# Patient Record
Sex: FEMALE | Race: WHITE | NOT HISPANIC OR LATINO | Employment: FULL TIME | URBAN - METROPOLITAN AREA
[De-identification: names, ages, dates, MRNs, and addresses within clinical notes are randomized per-mention and may not be internally consistent; named-entity substitution may affect disease eponyms.]

---

## 2017-01-02 ENCOUNTER — OFFICE VISIT (OUTPATIENT)
Dept: URGENT CARE | Facility: URGENT CARE | Age: 52
End: 2017-01-02
Payer: COMMERCIAL

## 2017-01-02 VITALS
TEMPERATURE: 98.4 F | WEIGHT: 141 LBS | RESPIRATION RATE: 16 BRPM | OXYGEN SATURATION: 97 % | HEART RATE: 88 BPM | SYSTOLIC BLOOD PRESSURE: 96 MMHG | DIASTOLIC BLOOD PRESSURE: 60 MMHG | BODY MASS INDEX: 20.81 KG/M2

## 2017-01-02 DIAGNOSIS — J01.90 ACUTE SINUSITIS WITH SYMPTOMS > 10 DAYS: Primary | ICD-10-CM

## 2017-01-02 PROCEDURE — 99203 OFFICE O/P NEW LOW 30 MIN: CPT | Performed by: FAMILY MEDICINE

## 2017-01-02 RX ORDER — BENZONATATE 100 MG/1
200 CAPSULE ORAL 3 TIMES DAILY PRN
Qty: 42 CAPSULE | Refills: 0 | Status: SHIPPED | OUTPATIENT
Start: 2017-01-02 | End: 2018-07-22

## 2017-01-02 RX ORDER — ALBUTEROL SULFATE 90 UG/1
2 AEROSOL, METERED RESPIRATORY (INHALATION) EVERY 6 HOURS PRN
Qty: 3 INHALER | Refills: 1 | Status: SHIPPED | OUTPATIENT
Start: 2017-01-02 | End: 2020-12-20

## 2017-01-02 RX ORDER — CEFDINIR 300 MG/1
300 CAPSULE ORAL 2 TIMES DAILY
Qty: 20 CAPSULE | Refills: 0 | Status: SHIPPED | OUTPATIENT
Start: 2017-01-02 | End: 2018-07-22

## 2017-01-02 NOTE — NURSING NOTE
"Chief Complaint   Patient presents with     Urgent Care     URI     Started 1 week ago.      Cough     Productive cough with green mucous. Trouble sleeping at  night     Ear Problem     Left ear pain for 1 week.      Sinus Problem     Sinus headache and pressure since saturday.     Fever     100.3 last night       Initial BP 96/60 mmHg  Pulse 88  Temp(Src) 98.4  F (36.9  C) (Tympanic)  Resp 16  Wt 141 lb (63.957 kg)  SpO2 97% Estimated body mass index is 20.81 kg/(m^2) as calculated from the following:    Height as of 9/3/12: 5' 9\" (1.753 m).    Weight as of this encounter: 141 lb (63.957 kg).  BP completed using cuff size: regular    ASAEL Miller    "

## 2018-07-22 ENCOUNTER — OFFICE VISIT (OUTPATIENT)
Dept: URGENT CARE | Facility: URGENT CARE | Age: 53
End: 2018-07-22
Payer: COMMERCIAL

## 2018-07-22 ENCOUNTER — RADIANT APPOINTMENT (OUTPATIENT)
Dept: GENERAL RADIOLOGY | Facility: CLINIC | Age: 53
End: 2018-07-22
Attending: FAMILY MEDICINE
Payer: COMMERCIAL

## 2018-07-22 VITALS
BODY MASS INDEX: 22.22 KG/M2 | SYSTOLIC BLOOD PRESSURE: 122 MMHG | HEIGHT: 69 IN | HEART RATE: 68 BPM | DIASTOLIC BLOOD PRESSURE: 60 MMHG | WEIGHT: 150 LBS | TEMPERATURE: 98.7 F | OXYGEN SATURATION: 98 %

## 2018-07-22 DIAGNOSIS — M79.675 PAIN OF TOE OF LEFT FOOT: ICD-10-CM

## 2018-07-22 DIAGNOSIS — S92.912A: Primary | ICD-10-CM

## 2018-07-22 PROCEDURE — 99213 OFFICE O/P EST LOW 20 MIN: CPT | Performed by: FAMILY MEDICINE

## 2018-07-22 PROCEDURE — 73660 X-RAY EXAM OF TOE(S): CPT | Mod: LT

## 2018-07-22 NOTE — NURSING NOTE
"Ashlie Gorman;   Chief Complaint   Patient presents with     Foot Problems     chair fell onto left foot, 3rd toe, feeling pressure      Urgent Care     Initial /60 (BP Location: Right arm, Patient Position: Chair, Cuff Size: Adult Regular)  Pulse 68  Temp 98.7  F (37.1  C) (Oral)  Ht 5' 8.75\" (1.746 m)  Wt 150 lb (68 kg)  SpO2 98%  BMI 22.31 kg/m2 Estimated body mass index is 22.31 kg/(m^2) as calculated from the following:    Height as of this encounter: 5' 8.75\" (1.746 m).    Weight as of this encounter: 150 lb (68 kg)..  BP completed using cuff size regular.  Sylvie Gaitan R.N.  "

## 2018-07-22 NOTE — PROGRESS NOTES
"SUBJECTIVE:  Chief Complaint   Patient presents with     Foot Problems     chair fell onto left foot, 3rd toe, feeling pressure      Urgent Care     Ashlie Gorman is a 53 year old female presents with a chief complaint of pain, throbbing, bruising at the left third toe.  The injury occurred yesterday at around 11 am   The injury happened while at home and was barefoot when the injury occurred. How: a chair fell onto the patient's left third toe, causing bruising, swelling, pain.  The patient complained of pain  and has has had decreased ROM.  Pain exacerbated by movement of the affected toe.  .  This is the first time this type of injury has occurred to this patient.     Last Tetanus booster was within the past 10 years.     Past medical history:    Hypothyroidism  Seasonal Allergies    Current Outpatient Prescriptions   Medication Sig Dispense Refill     Fexofenadine HCl (ALLEGRA PO)        SYNTHROID 112 MCG OR TABS 1 TABLET DAILY       albuterol (PROAIR HFA/PROVENTIL HFA/VENTOLIN HFA) 108 (90 BASE) MCG/ACT Inhaler Inhale 2 puffs into the lungs every 6 hours as needed for shortness of breath / dyspnea or wheezing (Patient not taking: Reported on 7/22/2018) 3 Inhaler 1     Social History   Substance Use Topics     Smoking status: Never Smoker     Smokeless tobacco: Never Used     Alcohol use Not on file       ROS:  Review of systems negative except as stated above.    EXAM:   /60 (BP Location: Right arm, Patient Position: Chair, Cuff Size: Adult Regular)  Pulse 68  Temp 98.7  F (37.1  C) (Oral)  Ht 5' 8.75\" (1.746 m)  Wt 150 lb (68 kg)  SpO2 98%  BMI 22.31 kg/m2  Gen: healthy,alert,no distress  Extremity: Left third toe has a hematoma present at the distal left third toe.  No dehisced lacerations.  There is a superficial abrasion at the tip of the left third toe.  The toenail is intact.    GAIT:  within normal limits     X-RAY was done.  X-rays of the left third toe:   comminuted fracture at the distal " phalanx.      ASSESSMENT:   Left third toe pain  Closed Fracture of the distal phalanx of the left third toe.       PLAN:  1) Rest, Ice, elevation  2) Gary tape the injured toe to the neighboring toe to form a dynamic splint  3) Antibiotic ointment.  Keep the wound covered until a scab forms.   4) Patient is concerned about this toe injury, since she will be on vacation in a month (hiking in [place]).  As a result, I ordered a podiatry referral for the patient.       Selvin Shields MD

## 2018-07-22 NOTE — PATIENT INSTRUCTIONS
follow up with a podiatrist for further evaluation and treatment.      Apply antibiotic ointment onto the open wounds and cover with gauze    Tape the injured toe to a neighboring toe to serve as a dynamic splint.      Elevate the left foot above the level of the heart to decrease some of the pain.

## 2018-07-22 NOTE — MR AVS SNAPSHOT
After Visit Summary   7/22/2018    Ashlie Gorman    MRN: [id number]           Patient Information     Date Of Birth          1965        Visit Information        Provider Department      7/22/2018 12:30 PM Selvin Shields MD [facility]        Today's Diagnoses     Closed fracture of distal phalanx of toe of left foot    -  1    Pain of toe of left foot          Care Instructions    follow up with a podiatrist for further evaluation and treatment.      Apply antibiotic ointment onto the open wounds and cover with gauze    Tape the injured toe to a neighboring toe to serve as a dynamic splint.      Elevate the left foot above the level of the heart to decrease some of the pain.               Follow-ups after your visit        Additional Services     PODIATRY/FOOT & ANKLE SURGERY REFERRAL       Your provider has referred you to: PREFERRED PROVIDERS:  FMG: [facility] [phone]   [email or web address]  FMG: [facility] [phone]   [email or web address]    Please be aware that coverage of these services is subject to the terms and limitations of your health insurance plan.  Call member services at your health plan with any benefit or coverage questions.      Please bring the following to your appointment:  >>   Any x-rays, CTs or MRIs which have been performed.  Contact the facility where they were done to arrange for  prior to your scheduled appointment.    >>   List of current medications   >>   This referral request   >>   Any documents/labs given to you for this referral                  Who to contact     If you have questions or need follow up information about today's clinic visit or your schedule please contact [facility] directly at [phone].  Normal or non-critical lab and imaging results will be communicated to you by MyChart, letter or phone within 4  "business days after the clinic has received the results. If you do not hear from us within 7 days, please contact the clinic through [employer] or phone. If you have a critical or abnormal lab result, we will notify you by phone as soon as possible.  Submit refill requests through [email or web address] or call your pharmacy and they will forward the refill request to us. Please allow 3 business days for your refill to be completed.          Additional Information About Your Visit        Care EveryWhere ID     This is your Care EveryWhere ID. This could be used by other organizations to access your [place] medical records  [id number]        Your Vitals Were     Pulse Temperature Height Pulse Oximetry BMI (Body Mass Index)       68 98.7  F (37.1  C) (Oral) 5' 8.75\" (1.746 m) 98% 22.31 kg/m2        Blood Pressure from Last 3 Encounters:   07/22/18 122/60   01/02/17 96/60   09/03/12 122/80    Weight from Last 3 Encounters:   07/22/18 150 lb (68 kg)   01/02/17 141 lb (64 kg)   09/03/12 137 lb (62.1 kg)              We Performed the Following     PODIATRY/FOOT & ANKLE SURGERY REFERRAL        Primary Care Provider Fax #    [place] [facility] [phone]       No address on file        Equal Access to Services     JACQUELYN OJEDA : Hadii maria m ku hadasho Sokalaniali, waaxda luqadaha, qaybta kaalmada adeegyada, waxay juan antonio ho . So [facility] [phone].    ATENCIÓN: Si habla español, tiene a enrique disposición servicios gratuitos de asistencia lingüística. Llame al [phone].    We comply with applicable federal civil rights laws and [place] laws. We do not discriminate on the basis of race, color, national origin, age, disability, sex, sexual orientation, or gender identity.            Thank you!     [facility] you for choosing [facility]  for your care. Our goal is always to provide you with excellent care. Hearing back from our patients is one way we can continue to improve our services. Please take a few " minutes to complete the written survey that you may receive in the mail after your visit with us. Thank you!             Your Updated Medication List - Protect others around you: Learn how to safely use, store and throw away your medicines at www.disposemymeds.org.          This list is accurate as of 7/22/18  2:09 PM.  Always use your most recent med list.                   Brand Name Dispense Instructions for use Diagnosis    albuterol 108 (90 Base) MCG/ACT Inhaler    PROAIR HFA/PROVENTIL HFA/VENTOLIN HFA    3 Inhaler    Inhale 2 puffs into the lungs every 6 hours as needed for shortness of breath / dyspnea or wheezing    Acute sinusitis with symptoms > 10 days       ALLEGRA PO           SYNTHROID 112 MCG tablet   Generic drug:  levothyroxine      1 TABLET DAILY

## 2018-07-23 ENCOUNTER — OFFICE VISIT (OUTPATIENT)
Dept: PODIATRY | Facility: CLINIC | Age: 53
End: 2018-07-23
Payer: COMMERCIAL

## 2018-07-23 VITALS
WEIGHT: 150 LBS | DIASTOLIC BLOOD PRESSURE: 64 MMHG | HEIGHT: 69 IN | SYSTOLIC BLOOD PRESSURE: 120 MMHG | BODY MASS INDEX: 22.22 KG/M2

## 2018-07-23 DIAGNOSIS — S91.209A TRAUMATIC AVULSION OF NAIL PLATE OF TOE, INITIAL ENCOUNTER: ICD-10-CM

## 2018-07-23 DIAGNOSIS — L60.1 ONYCHOLYSIS OF TOENAIL: ICD-10-CM

## 2018-07-23 DIAGNOSIS — S90.222A CONTUSION OF LESSER TOE OF LEFT FOOT WITH DAMAGE TO NAIL, INITIAL ENCOUNTER: Primary | ICD-10-CM

## 2018-07-23 PROCEDURE — 99203 OFFICE O/P NEW LOW 30 MIN: CPT | Mod: 25 | Performed by: PODIATRIST

## 2018-07-23 PROCEDURE — 11730 AVULSION NAIL PLATE SIMPLE 1: CPT | Mod: T2 | Performed by: PODIATRIST

## 2018-07-23 RX ORDER — CEPHALEXIN 500 MG/1
500 CAPSULE ORAL 3 TIMES DAILY
Qty: 30 CAPSULE | Refills: 0 | Status: SHIPPED | OUTPATIENT
Start: 2018-07-23 | End: 2020-12-20

## 2018-07-23 NOTE — MR AVS SNAPSHOT
After Visit Summary   7/23/2018    Ashlie Gorman    MRN: [id number]           Patient Information     Date Of Birth          1965        Visit Information        Provider Department      7/23/2018 8:00 AM Hamzah Darling DPM [facility]        Today's Diagnoses     Contusion of lesser toe of left foot with damage to nail, initial encounter    -  1      Care Instructions    Thank you for choosing [facility]!    DR. DARLING'S CLINIC LOCATIONS:   MONDAY - [place] TUESDAY - [place]   [address]  [address]   [place] [address] [place] [address]   [phone]       THURSDAY AM - [place] THURSDAY PM - [place]   [address]   [place] [address] [place] [address]   [phone]       FRIDAY AM - [place] SET UP SURGERY: [phone] [address] APPOINTMENTS: [phone]   [place] [address] BILLING QUESTIONS: [phone] FAX NUMBER: [phone]     [place]OWN TOENAIL REMOVAL HOME INSTRUCTIONS  1. After the procedure, go home and elevate the foot/feet for the remainder of the day/evening as able. This is to minimize swelling, control pain, and limit post-procedural complications. The pre-procedural injection may cause your toe to be numb anywhere from 1-2 hours.    2. You can take Tylenol, Ibuprofen, Advil, etc as needed for pain if tolerated. Follow label instructions.     3. If you have been given a prescription for antibiotics, take them as instructed and complete the entire prescription.    4. Keep dressing intact until the following morning. Then remove the bandage (you may need to soak it in warm soapy water as the bandage will likely adhere to your skin).    5. Start soaking in warm soapy water for 5-10 minutes twice a day. Wash the toe thoroughly, dry the toe thoroughly. Apply antibiotic wound ointment to base of wound and cover with  gauze and Coban dressing (not too tightly) until it stops draining. This may take a few days to weeks, but at that point, you may continue with antibiotic ointment and a band-aid, or you may stop applying a dressing all together. Dressing changes should be done twice daily if you had the permanent/chemical procedure done.    6. You may do activities as tolerated the following day. Find a shoe that is comfortable and minimizes the amount of rubbing on your toe, as this may increase pain, swelling, etc.    7. Monitor for signs of infection. With this procedure, it is common to have mild surrounding redness and drainage. If the redness involves the entire great toe or if you notice red streaks on top of your foot, or if you experience any nausea, vomiting, chills, fevers > 101 degrees, call clinic for a quick appointment.        Body Mass Index (BMI)  Many things can cause foot and ankle problems. Foot structure, activity level, foot mechanics and injuries are common causes of pain.  One very important issue that often goes unmentioned, is body weight.  Extra weight can cause increased stress on muscles, ligaments, bones and tendons.  Sometimes just a few extra pounds is all it takes to put one over her/his threshold. Without reducing that stress, it can be difficult to alleviate pain. Some people are uncomfortable addressing this issue, but we feel it is important for you to think about it. As Foot &  Ankle specialists, our job is addressing the lower extremity problem and possible causes. Regarding extra body weight, we encourage patients to discuss diet and weight management plans with their primary care doctors. It is this team approach that gives you the best opportunity for pain relief and getting you back on your feet.              Follow-ups after your visit        Your next 10 appointments already scheduled     Aug 06, 2018  8:15 AM CDT   Return Visit with SHILA [facility]  "([facility] [place])    [address]  [address]  [place] [address] [phone]              Who to contact     If you have questions or need follow up information about today's clinic visit or your schedule please contact [facility] directly at [phone].  Normal or non-critical lab and imaging results will be communicated to you by MyChart, letter or phone within 4 business days after the clinic has received the results. If you do not hear from us within 7 days, please contact the clinic through MyChart or phone. If you have a critical or abnormal lab result, we will notify you by phone as soon as possible.  Submit refill requests through [employer] or call your pharmacy and they will forward the refill request to us. Please allow 3 business days for your refill to be completed.          Additional Information About Your Visit        Care EveryWhere ID     This is your Care EveryWhere ID. This could be used by other organizations to access your [place] medical records  [id number]        Your Vitals Were     Height BMI (Body Mass Index)                5' 8.75\" (1.746 m) 22.31 kg/m2           Blood Pressure from Last 3 Encounters:   07/23/18 120/64   07/22/18 122/60   01/02/17 96/60    Weight from Last 3 Encounters:   07/23/18 150 lb (68 kg)   07/22/18 150 lb (68 kg)   01/02/17 141 lb (64 kg)              Today, you had the following     No orders found for display         Today's Medication Changes          These changes are accurate as of 7/23/18  8:54 AM.  If you have any questions, ask your nurse or doctor.               Start taking these medicines.        Dose/Directions    cephALEXin 500 MG capsule   Commonly known as:  KEFLEX   Used for:  Contusion of lesser toe of left foot with damage to nail, initial encounter   Started by:  Hamzah Barney DPM        Dose:  500 mg   Take 1 capsule (500 mg) by mouth 3 times daily   Quantity:  30 capsule   Refills:  0          "   Where to get your medicines      These medications were sent to [facility] [place] - [place] - [address]   [address]  [address], [place] [address]     Phone:  [phone]     cephALEXin 500 MG capsule                Primary Care Provider Fax #    [facility] [phone]       No address on file        Equal Access to Services     [facility]: Hadii aad ku hadasho Soomaali, waaxda luqadaha, qaybta kaalmada adeegyada, waxay idiin hayaan adeeg kharash laTonyaan . So [facility] [phone].    ATENCIÓN: Si habla español, tiene a enrique disposición servicios gratuitos de asistencia lingüística. Llame al [phone].    We comply with applicable federal civil rights laws and [place] laws. We do not discriminate on the basis of race, color, national origin, age, disability, sex, sexual orientation, or gender identity.            Thank you!     Thank you for choosing [facility]  for your care. Our goal is always to provide you with excellent care. Hearing back from our patients is one way we can continue to improve our services. Please take a few minutes to complete the written survey that you may receive in the mail after your visit with us. Thank you!             Your Updated Medication List - Protect others around you: Learn how to safely use, store and throw away your medicines at www.disposemymeds.org.          This list is accurate as of 7/23/18  8:54 AM.  Always use your most recent med list.                   Brand Name Dispense Instructions for use Diagnosis    albuterol 108 (90 Base) MCG/ACT Inhaler    PROAIR HFA/PROVENTIL HFA/VENTOLIN HFA    3 Inhaler    Inhale 2 puffs into the lungs every 6 hours as needed for shortness of breath / dyspnea or wheezing    Acute sinusitis with symptoms > 10 days       ALLEGRA PO           cephALEXin 500 MG capsule    KEFLEX    30 capsule    Take 1 capsule (500 mg) by mouth 3 times daily    Contusion of lesser toe of left  foot with damage to nail, initial encounter       SYNTHROID 112 MCG tablet   Generic drug:  levothyroxine      1 TABLET DAILY

## 2018-07-23 NOTE — PROGRESS NOTES
"Foot & Ankle Surgery  July 23, 2018    CC: \"broken toe\"    I was asked to see Ashliesolange Gorman regarding the chief complaint by:  Dr. Shields    HPI:  Pt is a 53 year old female who presents with above complaint.  2 days ago, she was picking her son off of a kitchen chair when the chair fell on her toe.  Yesterday, she was seen by Dr Shields where xrays showed a distal tuft fracture of the distal phalanx L 3rd toe.  She has a large blood blister and the nail is detached from the proximal nail fold with serous drainage and mild erythema.  Pain 3/10, worse with walking.  She has wrapped the toe to the adjacent toe.  She believes her tetanus status is up to date.    ROS:   Pos for CC.  The patient denies current nausea, vomiting, chills, fevers, belly pain, calf pain, chest pain or SOB.  Complete remainder of ROS is otherwise neg.    VITALS:    Vitals:    07/23/18 0808   BP: 120/64   Weight: 150 lb (68 kg)   Height: 5' 8.75\" (1.746 m)       PMH:  No past medical history on file.    SXHX:  No past surgical history on file.     MEDS:    Current Outpatient Prescriptions   Medication     albuterol (PROAIR HFA/PROVENTIL HFA/VENTOLIN HFA) 108 (90 BASE) MCG/ACT Inhaler     Fexofenadine HCl (ALLEGRA PO)     SYNTHROID 112 MCG OR TABS     No current facility-administered medications for this visit.        ALL:   No Known Allergies    FMH:  No family history on file.    SocHx:    Social History     Social History     Marital status:      Spouse name: N/A     Number of children: N/A     Years of education: N/A     Occupational History     Not on file.     Social History Main Topics     Smoking status: Never Smoker     Smokeless tobacco: Never Used     Alcohol use Not on file     Drug use: Not on file     Sexual activity: Not on file     Other Topics Concern     Not on file     Social History Narrative           EXAMINATION:  Gen:   No apparent distress  Neuro:   A&Ox3, no deficits  Psych:    Answering questions appropriately for age " and situation with normal affect  Head:    NCAT  Eye:    Visual scanning without deficit  Ear:    Response to auditory stimuli wnl  Lung:    Non-labored breathing on RA noted  Abd:    NTND per patient report  Lymph:    Neg for pitting/non-pitting edema BLE  Vasc:    Pulses palpable, CFT minimally delayed  Neuro:    Light touch sensation intact to all sensory nerve distributions without paresthesias  Derm:    Blood blister distal 1/3 of the L 3rd toe.  The nail is loose from the proximal nail fold with serous drainage and mild erythema.  No laceration noted at the nail bed after nail removal.    MSK:    ROM, strength wnl without limitation, no pain on palpation noted.  Calf:    Neg for redness, swelling or tenderness      Imaging:  xrays L 3rd toe 7/22/18 - IMPRESSION: Tuft fracture of third digit.    Assessment:  53 year old female with contusionL 3rd toe with distal tuft fracture      Plan:  Discussed etiologies, anatomy and options  1.  Contusion L 3rd toe with distal tuft fracture and traumatic onycholysis 3rd toenail  -personally reviewed imaging  -nail removal to inspect nail bed, see procedure note  -Rx for keflex 500mg TID x 10 days  -she states she believes she is up to date on her tetanus but will check with her PCP    Regarding the nail, procedure options were discussed.  They elected to go with Total temporary avulsion.  See procedure note for details.  Risks that were discussed include but are not limited to infection, wound healing complications, nerve irritation, recurrence of the ingrown nail and the need for further procedures.  Follow up 2 weeks for re-evaluation or sooner with acute issues.  Antibiotic:  keflex     After discussing the procedure, as well as risks, complications and post-procedure instructions, informed consent was obtained.    Anesthesia:  3 cc's of  1% lidocaine plain    Procedure:  After adequate prep, and with anesthesia achieved,  attention was directed to the L 3rd toe  where  the nail plate was freed from surrounding soft tissue and then removed in total.  The base of the wound was explored and showed no necrotic tissue, purulence or debris.   A clean dressing was applied loosely to prevent vascular insult.  The patient tolerated the procedure well without complications.    Post-procedural instructions were dispensed and discussed with the patient.  All questions were answered.         Follow up:  2 weeks or sooner with acute issues      Patient's medical history was reviewed today    Body mass index is 22.31 kg/(m^2).          Hamzah Barney DPM FACFAS FACFAOM  Podiatric Foot & Ankle Surgeon  [facility]  [phone]

## 2018-07-23 NOTE — PATIENT INSTRUCTIONS
Thank you for choosing [facility]!    DR. DARLING'S CLINIC LOCATIONS:   MONDAY - [place] TUESDAY - [place]   [address] [facility]  [address]   [place] [address] [place] [address]   [phone]       THURSDAY AM - [place] THURSDAY PM - [place]   [address]   [place] [address] [place] [address]   [phone]       FRIDAY AM - [place] SET UP SURGERY: [phone] [address] APPOINTMENTS: [phone]   [place] [address] BILLING QUESTIONS: [phone] FAX NUMBER: [phone]     [place] TOENAIL REMOVAL HOME INSTRUCTIONS  1. After the procedure, go home and elevate the foot/feet for the remainder of the day/evening as able. This is to minimize swelling, control pain, and limit post-procedural complications. The pre-procedural injection may cause your toe to be numb anywhere from 1-2 hours.    2. You can take Tylenol, Ibuprofen, Advil, etc as needed for pain if tolerated. Follow label instructions.     3. If you have been given a prescription for antibiotics, take them as instructed and complete the entire prescription.    4. Keep dressing intact until the following morning. Then remove the bandage (you may need to soak it in warm soapy water as the bandage will likely adhere to your skin).    5. Start soaking in warm soapy water for 5-10 minutes twice a day. Wash the toe thoroughly, dry the toe thoroughly. Apply antibiotic wound ointment to base of wound and cover with gauze and Coban dressing (not too tightly) until it stops draining. This may take a few days to weeks, but at that point, you may continue with antibiotic ointment and a band-aid, or you may stop applying a dressing all together. Dressing changes should be done twice daily if you had the permanent/chemical procedure done.    6. You may do activities as tolerated the following day. Find a shoe that is  comfortable and minimizes the amount of rubbing on your toe, as this may increase pain, swelling, etc.    7. Monitor for signs of infection. With this procedure, it is common to have mild surrounding redness and drainage. If the redness involves the entire great toe or if you notice red streaks on top of your foot, or if you experience any nausea, vomiting, chills, fevers > 101 degrees, call clinic for a quick appointment.        Body Mass Index (BMI)  Many things can cause foot and ankle problems. Foot structure, activity level, foot mechanics and injuries are common causes of pain.  One very important issue that often goes unmentioned, is body weight.  Extra weight can cause increased stress on muscles, ligaments, bones and tendons.  Sometimes just a few extra pounds is all it takes to put one over her/his threshold. Without reducing that stress, it can be difficult to alleviate pain. Some people are uncomfortable addressing this issue, but we feel it is important for you to think about it. As Foot &  Ankle specialists, our job is addressing the lower extremity problem and possible causes. Regarding extra body weight, we encourage patients to discuss diet and weight management plans with their primary care doctors. It is this team approach that gives you the best opportunity for pain relief and getting you back on your feet.

## 2018-08-06 ENCOUNTER — OFFICE VISIT (OUTPATIENT)
Dept: PODIATRY | Facility: CLINIC | Age: 53
End: 2018-08-06
Payer: COMMERCIAL

## 2018-08-06 VITALS
SYSTOLIC BLOOD PRESSURE: 106 MMHG | DIASTOLIC BLOOD PRESSURE: 58 MMHG | WEIGHT: 150 LBS | HEIGHT: 68 IN | BODY MASS INDEX: 22.73 KG/M2

## 2018-08-06 DIAGNOSIS — L60.0 INGROWING NAIL: Primary | ICD-10-CM

## 2018-08-06 DIAGNOSIS — S90.222D: ICD-10-CM

## 2018-08-06 PROCEDURE — 99213 OFFICE O/P EST LOW 20 MIN: CPT | Performed by: PODIATRIST

## 2018-08-06 ASSESSMENT — PAIN SCALES - GENERAL: PAINLEVEL: MILD PAIN (2)

## 2018-08-06 NOTE — MR AVS SNAPSHOT
"              After Visit Summary   8/6/2018    Ashlie Gormna    MRN: [id number]           Patient Information     Date Of Birth          1965        Visit Information        Provider Department      8/6/2018 8:15 AM Hamzah Barney DPM [facility]        Today's Diagnoses     Ingrowing nail    -  1    Contusion of left lesser toe(s) with damage to nail, subsequent encounter           Follow-ups after your visit        Follow-up notes from your care team     Return if symptoms worsen or fail to improve.      Who to contact     If you have questions or need follow up information about today's clinic visit or your schedule please contact [facility] directly at [phone].  Normal or non-critical lab and imaging results will be communicated to you by MyChart, letter or phone within 4 business days after the clinic has received the results. If you do not hear from us within 7 days, please contact the clinic through MyChart or phone. If you have a critical or abnormal lab result, we will notify you by phone as soon as possible.  Submit refill requests through [employer] or call your pharmacy and they will forward the refill request to us. Please allow 3 business days for your refill to be completed.          Additional Information About Your Visit        Care EveryWhere ID     This is your Care EveryWhere ID. This could be used by other organizations to access your [facility] medical records  [id number]        Your Vitals Were     Height BMI (Body Mass Index)                5' 8\" (1.727 m) 22.81 kg/m2           Blood Pressure from Last 3 Encounters:   08/06/18 106/58   07/23/18 120/64   07/22/18 122/60    Weight from Last 3 Encounters:   08/06/18 150 lb (68 kg)   07/23/18 150 lb (68 kg)   07/22/18 150 lb (68 kg)              Today, you had the following     No orders found for display       Primary Care Provider Fax #    [facility] [phone]       No address on file        Equal " "Chief Complaint   Patient presents with     Vaginal Problem       Initial /68 (BP Location: Right arm, Patient Position: Chair, Cuff Size: Adult Regular)  Pulse 78  Temp 99.3  F (37.4  C) (Oral)  Ht 5' 7\" (1.702 m)  Wt 173 lb (78.5 kg)  Breastfeeding? No  BMI 27.1 kg/m2 Estimated body mass index is 27.1 kg/(m^2) as calculated from the following:    Height as of this encounter: 5' 7\" (1.702 m).    Weight as of this encounter: 173 lb (78.5 kg).  Medication Reconciliation: complete     Josemanuel Warner CMA          " Access to Services     [facility]: Hadii aad ku hadjacielbalaji Marva, wajacintoda luqadaha, qaybta kanarindertessie gomez. So [facility] [phone].    ATENCIÓN: Si habla español, tiene a enrique disposición servicios gratuitos de asistencia lingüística. Llame al [phone].    We comply with applicable federal civil rights laws and [place] laws. We do not discriminate on the basis of race, color, national origin, age, disability, sex, sexual orientation, or gender identity.            Thank you!     Thank you for choosing [facility]  for your care. Our goal is always to provide you with excellent care. Hearing back from our patients is one way we can continue to improve our services. Please take a few minutes to complete the written survey that you may receive in the mail after your visit with us. Thank you!             Your Updated Medication List - Protect others around you: Learn how to safely use, store and throw away your medicines at www.disposemymeds.org.          This list is accurate as of 8/6/18  9:18 AM.  Always use your most recent med list.                   Brand Name Dispense Instructions for use Diagnosis    albuterol 108 (90 Base) MCG/ACT Inhaler    PROAIR HFA/PROVENTIL HFA/VENTOLIN HFA    3 Inhaler    Inhale 2 puffs into the lungs every 6 hours as needed for shortness of breath / dyspnea or wheezing    Acute sinusitis with symptoms > 10 days       ALLEGRA PO           cephALEXin 500 MG capsule    KEFLEX    30 capsule    Take 1 capsule (500 mg) by mouth 3 times daily    Contusion of lesser toe of left foot with damage to nail, initial encounter       SYNTHROID 112 MCG tablet   Generic drug:  levothyroxine      1 TABLET DAILY

## 2018-08-06 NOTE — PROGRESS NOTES
"Foot & Ankle Surgery   August 6, 2018    S:  Pt is seen today for evaluation of L 3rd toe contusion, blood blister and traumatic avulsion of the toenail.  The nail was removed last visit and she was put on a PO abx course with a wound care plan.  She presents today for follow up.  Minimal drainage at this point, she's still doing the bandaid and ointment dressing change daily.  She's wearing sandals, which helps.  The pain in the toe is now more of a \"bruise\"-type feeling rather than pain.  She's doing on a hiking trip the last weekend of August.  She also has a question about chronic callusing on her R heel.    Vitals:    08/06/18 0828   BP: 106/58   Weight: 150 lb (68 kg)   Height: 5' 8\" (1.727 m)   '      ROS - Pos for CC.  Patient denies current nausea, vomiting, chills, fevers, belly pain, calf pain, chest pain or SOB.  Complete remainder of ROS it otherwise neg.      PE:  Gen:   No apparent distress  Eye:    Visual scanning without deficit  Ear:    Response to auditory stimuli wnl  Lung:    Non-labored breathing on RA noted  Abd:    NTND per patient report  Lymph:    Neg for pitting/non-pitting edema BLE  Vasc:    Pulses palpable, CFT minimally delayed  Neuro:    Light touch sensation intact to all sensory nerve distributions without paresthesias  Derm:    L 3rd toe - nail bed fully epithelialized.  Dry stable blood blister.  No open wounds, no SOI.  Mild callusing lateral R heel  MSK:    L 3rd toe - minimal discomfort  Calf:    Neg for redness, swelling or tenderness      Assessment:  53 year old female with contusion L 3rd toe      Plan:  Discussed etiologies, anatomy and options  1.  Contusion L 3rd toe with traumatic partial avulsion 2 weeks sp removal  -ok to bandage if nail bed is sensitive but otherwise no specific wound cares are needed as the wound is epithelialized  -I expect dried blood blister to slough off once the tissue is fully healed  -comfortable shoe gear  -RICE/NSAID prn  -regarding " fracture, advised comfortable shoes, RICE/NSAID prn; activity modifications as needed.  A stiff-soled shoe would likely do better for pain control on her hiking trip.  -regarding the callusing, advised shoes with a heel counter, pumice therapy and regular lotion application    Follow up:  prn or sooner with acute issues      Body mass index is 22.81 kg/(m^2).           Hamzah Barney DPM FACFAS FACFAOM  Podiatric Foot & Ankle Surgeon  [facility]  [phone]

## 2020-12-20 ENCOUNTER — OFFICE VISIT (OUTPATIENT)
Dept: URGENT CARE | Facility: URGENT CARE | Age: 55
End: 2020-12-20
Payer: COMMERCIAL

## 2020-12-20 VITALS
RESPIRATION RATE: 16 BRPM | DIASTOLIC BLOOD PRESSURE: 64 MMHG | SYSTOLIC BLOOD PRESSURE: 108 MMHG | HEIGHT: 69 IN | OXYGEN SATURATION: 98 % | BODY MASS INDEX: 23.7 KG/M2 | HEART RATE: 86 BPM | WEIGHT: 160 LBS | TEMPERATURE: 97.1 F

## 2020-12-20 DIAGNOSIS — J01.90 ACUTE SINUSITIS WITH COEXISTING CONDITION REQUIRING PROPHYLACTIC TREATMENT: ICD-10-CM

## 2020-12-20 DIAGNOSIS — H92.02 OTALGIA, LEFT: Primary | ICD-10-CM

## 2020-12-20 PROCEDURE — 99214 OFFICE O/P EST MOD 30 MIN: CPT | Performed by: FAMILY MEDICINE

## 2020-12-20 RX ORDER — SPIRONOLACTONE 100 MG/1
100 TABLET, FILM COATED ORAL DAILY
COMMUNITY

## 2020-12-20 RX ORDER — METHYLPREDNISOLONE 4 MG
TABLET, DOSE PACK ORAL
Qty: 21 TABLET | Refills: 0 | Status: SHIPPED | OUTPATIENT
Start: 2020-12-20

## 2020-12-20 ASSESSMENT — MIFFLIN-ST. JEOR: SCORE: 1390.14

## 2020-12-20 NOTE — PROGRESS NOTES
"SUBJECTIVE:   Ashlie Gorman is a 55 year old female presenting with a chief complaint of bilateral ear pain.    Has been having intermittent eusthacian tube dysfunction over the past 2 years.  Recommended to use flonase, netti pot.  Has mild sinus congestion and did worsen recently.  Left ear feels more clogged and full.  Denies any cough or SOB.  Noticed more symptoms when lays down.  No fever.  Denies any recent close COVID positive.    Had vertigo when she had prior ear problems and this resolved on its own.  This developed similar symptoms last night so is worried.      Has underlying allergies and wondering about seeing allergist specialist    No past medical history on file.  Current Outpatient Medications   Medication Sig Dispense Refill     Fexofenadine HCl (ALLEGRA PO)        spironolactone (ALDACTONE) 100 MG tablet Take 100 mg by mouth daily       SYNTHROID 112 MCG OR TABS 1 TABLET DAILY       albuterol (PROAIR HFA/PROVENTIL HFA/VENTOLIN HFA) 108 (90 BASE) MCG/ACT Inhaler Inhale 2 puffs into the lungs every 6 hours as needed for shortness of breath / dyspnea or wheezing (Patient not taking: Reported on 12/20/2020) 3 Inhaler 1     cephALEXin (KEFLEX) 500 MG capsule Take 1 capsule (500 mg) by mouth 3 times daily (Patient not taking: Reported on 12/20/2020) 30 capsule 0     Social History     Tobacco Use     Smoking status: Never Smoker     Smokeless tobacco: Never Used   Substance Use Topics     Alcohol use: Not on file       ROS:  Review of systems negative except as stated above.    OBJECTIVE:  /64 (BP Location: Right arm, Patient Position: Sitting, Cuff Size: Adult Regular)   Pulse 86   Temp 97.1  F (36.2  C) (Tympanic)   Resp 16   Ht 1.753 m (5' 9\")   Wt 72.6 kg (160 lb)   SpO2 98%   Breastfeeding No   BMI 23.63 kg/m    GENERAL APPEARANCE: healthy, alert and no distress  EYES: EOMI,  PERRL, conjunctiva clear  HENT: ear canals and TM's normal. Sinus tenderness in maxillary  PSYCH: mentation " appears normal and affect normal/bright    ASSESSMENT/PLAN:  (H92.02) Otalgia, left  (primary encounter diagnosis)  Comment: eustachian tube dysfunction  Plan: methylPREDNISolone (MEDROL DOSEPAK) 4 MG tablet        therapy pack            (J01.90) Acute sinusitis with coexisting condition requiring prophylactic treatment  Plan: amoxicillin-clavulanate (AUGMENTIN) 875-125 MG         tablet            Reassurance given, reviewed symptomatic treatment with tylenol, ibuprofen, plenty of fluids and rest.  Reviewed chronic sinus symptoms and treatment option - encourage to continue with flonase, netti pot and allergy medication at this time.  Discussed antibiotic use and as patient has not been on antibiotic for over a year, RX Augmentin given for treatment.  Encourage to take sudafed to help with eustachian tube dysfunction and if not improving in couple of weeks, RX medrol dosepak given and can fill at that time.    Follow up with primary provider if no improvement of symptoms in 2 weeks    Leonel Bills MD,  December 20, 2020 11:45 AM

## 2020-12-20 NOTE — PATIENT INSTRUCTIONS
Okay to take ibuprofen 200 mg - 4 tablets (800 mg) every 8 hours as needed.  Okay to take tylenol 500 mg - 2 tablets (1000 mg) every 6-8 hours as needed, do not exceed 3000 mg in 24 hours.  Take full course of antibiotic - Augmentin - for sinus infection  Okay to take sudafed 60 mg every 6 hours as needed for eustachian tube dysfunction  Continue with flonase 50 mcg  Continue with netti pot  Okay to take claritin, allegra or zyrtec - max of 2 tablets a day for couple of weeks    Take Medrol dosepak if left eustachian tube dysfunction is not improving in couple of weeks        Patient Education     Sinusitis (Antibiotic Treatment)    The sinuses are air-filled spaces within the bones of the face. They connect to the inside of the nose. Sinusitis is an inflammation of the tissue that lines the sinuses. Sinusitis can occur during a cold. It can also happen due to allergies to pollens and other particles in the air. Sinusitis can cause symptoms of sinus congestion and a feeling of fullness. A sinus infection causes fever, headache, and facial pain. There is often green or yellow fluid draining from the nose or into the back of the throat (post-nasal drip). You have been given antibiotics to treat this condition.   Home care    Take the full course of antibiotics as instructed. Don't stop taking them, even when you feel better.    Drink plenty of water, hot tea, and other liquids as directed by the healthcare provider. This may help thin nasal mucus. It also may help your sinuses drain fluids.    Heat may help soothe painful areas of your face. Use a towel soaked in hot water. Or,  the shower and direct the warm spray onto your face. Using a vaporizer along with a menthol rub at night may also help soothe symptoms.     An expectorant with guaifenesin may help thin nasal mucus and help your sinuses drain fluids. Talk with your provider or pharmacists before taking an over-the-counter (OTC) medicine if you have any  questions about it or its side effects..    You can use an OTC decongestant, unless a similar medicine was prescribed to you. Nasal sprays work the fastest. Use one that contains phenylephrine or oxymetazoline. First blow your nose gently. Then use the spray. Don't use these medicines more often than directed on the label. If you do, your symptoms may get worse. You may also take pills that contain pseudoephedrine. Don t use products that combine multiple medicines. This is because side effects may be increased. Read labels. You can also ask the pharmacist for help. (People with high blood pressure should not use decongestants. They can raise blood pressure.) Talk with your provider or pharmacist if you have any questions about the medicine..    OTC antihistamines may help if allergies contributed to your sinusitis. Talk with your provider or pharmacist if you have any questions about the medicine..    Don't use nasal rinses or irrigation during an acute sinus infection, unless your healthcare provider tells you to. Rinsing may spread the infection to other areas in your sinuses.    Use acetaminophen or ibuprofen to control pain, unless another pain medicine was prescribed to you. If you have chronic liver or kidney disease or ever had a stomach ulcer, talk with your healthcare provider before using these medicines. Never give aspirin to anyone under age 18 who is ill with a fever. It may cause severe liver damage.    Don't smoke. This can make symptoms worse.    Follow-up care  Follow up with your healthcare provider, or as advised.   When to seek medical advice  Call your healthcare provider if any of these occur:     Facial pain or headache that gets worse    Stiff neck    Unusual drowsiness or confusion    Swelling of your forehead or eyelids    Symptoms don't go away in 10 days    Vision problems, such as blurred or double vision    Fever of 100.4 F (38 C) or higher, or as directed by your healthcare  provider  Call 911  Call 911 if any of these occur:     Seizure    Trouble breathing    Feeling dizzy or faint    Fingernails, skin or lips look blue, purple , or gray  Prevention  Here are steps you can take to help prevent an infection:     Keep good hand washing habits.    Don t have close contact with people who have sore throats, colds, or other upper respiratory infections.    Don t smoke, and stay away from secondhand smoke.    Stay up to date with of your vaccines.  [employer] last reviewed this educational content on 12/1/2019 2000-2020 [employer]. [address] [place] [id number]. All rights reserved. This information is not intended as a substitute for professional medical care. Always follow your healthcare professional's instructions.           Patient Education     Earache, No Infection (Adult)   Earaches can happen without an infection. They can occur when air and fluid build up behind the eardrum. They may cause a feeling of fullness and discomfort. They may also impair hearing. This is called otitis media with effusion (OME) or serous otitis media. It means there is fluid in the middle ear. It is not the same as acute otitis media, which is often from an infection.  OME can happen when you have a cold if congestion blocks the passage that drains the middle ear. This passage is called the eustachian tube. OME may also occur with nasal allergies or after a bacterial infection in the middle ear. Other causes are:    Trauma    Improper cleaning of wax from the ear    Bacterial infection of the mastoid bone (mastoiditis)    Tumor    Jaw pain    Changes in pressure, such as from flying or scuba diving    The pain or discomfort may come and go. You may hear clicking or popping sounds when you chew or swallow. You may feel that your balance is off. Or you may hear ringing in the ear.  It often takes from several weeks up to 3 months for the fluid to clear on its own. Oral pain  relievers and ear drops help if there is pain. Decongestants and antihistamines sometimes help. Antibiotics don't help since there is no infection. Your healthcare provider may give you a nasal spray to help reduce swelling in the nose and eustachian tube. This can allow the ear to drain.  If your OME doesn't get better after 3 months, surgery may be used to drain the fluid. A small tube may also be put in the eardrum to help with drainage.  Because the middle ear fluid can become infected, watch for signs of an infection. These may develop later. They may include increased ear pain, fever, or drainage from the ear.  Home care  These home-care tips will help you take care of yourself:    You may use over-the-counter medicine as directed by your healthcare provider to control pain, unless medicine was prescribed. If you have chronic liver or kidney disease or ever had a stomach ulcer or GI bleeding, talk with your healthcare provider before using any medicines.    Aspirin should never be used in anyone younger than age 18 who has a fever. It may cause severe liver damage.    Ask your healthcare provider if you may use over-the-counter decongestants such as phenylephrine or pseudoephedrine. Keep in mind they are not always helpful.    Talk with your healthcare provider about using nasal spray decongestants. Don't use them for more than 3 days, or as directed by your healthcare provider. Longer use can make congestion worse. Prescription nasal sprays from your healthcare provider don't often have such restrictions.    Antihistamines may help if you are also having allergy symptoms.    You may use medicines such as guaifenesin to thin mucus and help with drainage.  Follow-up care  Follow up with your healthcare provider or as advised if you are not feeling better after 3 days.  When to seek medical advice  Call your healthcare provider right away if any of these occur:    Ear pain that gets worse or that does not start  to get better     Fever of 100.4 F (38 C) or higher, or as directed by your healthcare provider    Fluid or blood draining from the ear    Headache or sinus pain    Stiff neck    Unusual drowsiness or confusion  [employer] last reviewed this educational content on 12/1/2019 2000-2020 [employer]. [address] [place] [id number]. All rights reserved. This information is not intended as a substitute for professional medical care. Always follow your healthcare professional's instructions.

## 2021-04-10 ENCOUNTER — HEALTH MAINTENANCE LETTER (OUTPATIENT)
Age: 56
End: 2021-04-10

## 2021-09-25 ENCOUNTER — HEALTH MAINTENANCE LETTER (OUTPATIENT)
Age: 56
End: 2021-09-25

## 2021-10-12 ENCOUNTER — OFFICE VISIT (OUTPATIENT)
Dept: URGENT CARE | Facility: URGENT CARE | Age: 56
End: 2021-10-12
Payer: COMMERCIAL

## 2021-10-12 VITALS
HEART RATE: 77 BPM | SYSTOLIC BLOOD PRESSURE: 110 MMHG | TEMPERATURE: 97.3 F | DIASTOLIC BLOOD PRESSURE: 62 MMHG | OXYGEN SATURATION: 99 %
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
## 2021-10-12 PROCEDURE — 99213 OFFICE O/P EST LOW 20 MIN: CPT | Performed by: PHYSICIAN ASSISTANT

## 2021-10-12 RX ORDER — CLINDAMYCIN PHOSPHATE 10 UG/ML
LOTION TOPICAL
COMMUNITY
Start: 2020-09-08

## 2021-10-12 RX ORDER — CETIRIZINE HYDROCHLORIDE 5 MG/1
5 TABLET ORAL DAILY
COMMUNITY

## 2021-10-12 RX ORDER — TRETINOIN 0.25 MG/G
CREAM TOPICAL
COMMUNITY
Start: 2020-09-08

## 2021-10-12 NOTE — PATIENT INSTRUCTIONS
Patient Education     Dog Bite  A dog bite can cause a wound deep enough to break the skin. In such cases, the wound is cleaned and sometimes closed. Wounds would be closed if they are gaping open or in cosmetically important areas such as the face. If the wound is closed, it is usually not completely closed. This is so that fluid can drain if the wound becomes infected. Often, wounds will be left open to heal. In addition to wound care, a tetanus shot (injection) may be given, if needed.     Home care    Wash your hands well with soap and warm water before and after caring for the wound. This helps lower the risk of infection.    Care for the wound as directed. If a dressing was applied to the wound, be sure to change it as directed.    If the wound bleeds, place a clean, soft cloth on the wound. Then firmly apply pressure until the bleeding stops. This may take up to 5 minutes. Don't release the pressure and look at the wound during this time.    Most wounds heal within 10 days. But an infection can occur even with correct treatment. So be sure to check the wound daily for signs of infection (see below).    Antibiotics may be prescribed. These help prevent or treat infection. If you re given antibiotics, take them as directed. Also be sure to complete the medicines.  Rabies prevention  Rabies is a virus that can be carried in certain animals. These can include domestic animals such as dogs and cats. Pets fully vaccinated against rabies (2 shots) are at very low risk of infection. But because human rabies is almost always fatal, any biting pet should be confined for 10 days as an extra precaution. In general, if there is a risk for rabies, the following steps may need to be taken:     If someone s pet dog has bitten you, it should be kept in a secure area for the next 10 days to watch for signs of illness. (If the pet owner won t allow this, contact your local animal control center.) Ask to see the pet's  vaccination history records. If the dog becomes ill or dies during that time, contact your local animal control center at once so the animal may be tested for rabies. If the dog stays healthy for the next 10 days, there is no danger of rabies in the animal or you.  ? If a stray dog bit you, contact your local animal control center. They can give information on capture, quarantine, and animal rabies testing.  ? If you can t find the animal that bit you in the next 2 days, and if rabies exists in your area, you may need to receive the rabies vaccine series. Call your healthcare provider right away. Or return to the emergency department promptly.  Follow-up care  Follow up with your healthcare provider, or as directed.  When to get medical advice  Call your healthcare provider or get medical attention right away if any of these occur:     Signs of infection:  ? Spreading redness or warmth from the wound  ? Increased pain or swelling  ? Fever of 100.4 F (38 C) or higher, or as directed by your healthcare provider  ? Colored fluid or pus draining from the wound    Signs of rabies infection. Don't wait for any of these symptoms to occur! If you suspect that the dog that bit you is rabid, or if the dog is lost and can't be found, you should get the vaccine series.  ? Headache  ? Confusion  ? Strange behavior  ? Increased salivating and drooling  ? Seizure  ? Hallucination, anxiety, or agitation  ? Fever    Decreased ability to move any body part near the wound    Bleeding that can't be stopped after 5 minutes of firm pressure  [employer] last reviewed this educational content on 8/1/2019 2000-2020 [employer]. [address] [place] [address], [place] [address]. All rights reserved. This information is not intended as a substitute for professional medical care. Always follow your healthcare professional's instructions.  This information has been modified by your health care provider with permission from the  publisher.

## 2021-10-14 NOTE — PROGRESS NOTES
Assessment & Plan     Dog bite, initial encounter  augmentin for dog bite  Warm moist compresses  Monitor for any worsening symptoms  - amoxicillin-clavulanate (AUGMENTIN) 875-125 MG tablet; Take 1 tablet by mouth 2 times daily for 5 days    Review of external notes as documented elsewhere in note      No follow-ups on file.    Karthikeyan Jerez PA-C  [facility] RAMIRO Dailey is a 56 year old who presents for the following health issues     HPI     Dog bite, hand  Puncture wound    Review of Systems   Constitutional, HEENT, cardiovascular, pulmonary, gi and gu systems are negative, except as otherwise noted.      Objective    /62   Pulse 77   Temp 97.3  F (36.3  C)   SpO2 99%   There is no height or weight on file to calculate BMI.  Physical Exam   GENERAL: healthy, alert and no distress  MS: Positive for hand puncture wound  SKIN: Positive for bruising, puncture wound  NEURO: Normal strength and tone, mentation intact and speech normal  PSYCH: mentation appears normal, affect normal/bright

## 2022-02-05 NOTE — PROGRESS NOTES
SUBJECTIVE: Ashlie Gorman is a 51 year old female patient complaining of sinus congestion for 11 day(s).     OBJECTIVE: The patient appears alert and mild distress.   EARS: External ears normal. Canals clear. TM's normal.  NOSE/SINUS: positive findings: mucosa erythematous and swollen  Sinus palpation: Maxillary sinus tender to palpation   THROAT: moderate erythema   NECK:positive findings: moderate anterior cervical nodes   CHEST: Clear    ASSESSMENT: Acute Sinusitis    PLAN: See orders.   In addition, I have suggested that the patient   Push fluids.    
Alert-The patient is alert, awake and responds to voice. The patient is oriented to time, place, and person. The triage nurse is able to obtain subjective information.

## 2022-05-07 ENCOUNTER — HEALTH MAINTENANCE LETTER (OUTPATIENT)
Age: 57
End: 2022-05-07

## 2022-12-26 ENCOUNTER — HEALTH MAINTENANCE LETTER (OUTPATIENT)
Age: 57
End: 2022-12-26

## 2023-04-22 ENCOUNTER — HEALTH MAINTENANCE LETTER (OUTPATIENT)
Age: 58
End: 2023-04-22

## 2024-02-04 ENCOUNTER — HEALTH MAINTENANCE LETTER (OUTPATIENT)
Age: 59
End: 2024-02-04

## 2024-09-14 ENCOUNTER — ANCILLARY PROCEDURE (OUTPATIENT)
Dept: GENERAL RADIOLOGY | Facility: CLINIC | Age: 59
End: 2024-09-14
Attending: FAMILY MEDICINE
Payer: COMMERCIAL

## 2024-09-14 ENCOUNTER — OFFICE VISIT (OUTPATIENT)
Dept: URGENT CARE | Facility: URGENT CARE | Age: 59
End: 2024-09-14
Payer: COMMERCIAL

## 2024-09-14 VITALS
DIASTOLIC BLOOD PRESSURE: 71 MMHG | TEMPERATURE: 97 F | HEART RATE: 75 BPM | SYSTOLIC BLOOD PRESSURE: 109 MMHG | OXYGEN SATURATION: 100 %

## 2024-09-14 DIAGNOSIS — J22 LOWER RESPIRATORY TRACT INFECTION: ICD-10-CM

## 2024-09-14 DIAGNOSIS — R05.2 SUBACUTE COUGH: Primary | ICD-10-CM

## 2024-09-14 PROCEDURE — 99214 OFFICE O/P EST MOD 30 MIN: CPT | Performed by: FAMILY MEDICINE

## 2024-09-14 PROCEDURE — 71046 X-RAY EXAM CHEST 2 VIEWS: CPT | Mod: TC | Performed by: RADIOLOGY

## 2024-09-14 RX ORDER — BENZONATATE 200 MG/1
200 CAPSULE ORAL 3 TIMES DAILY PRN
Qty: 30 CAPSULE | Refills: 0 | Status: SHIPPED | OUTPATIENT
Start: 2024-09-14

## 2024-09-14 RX ORDER — AZITHROMYCIN 250 MG/1
TABLET, FILM COATED ORAL
Qty: 6 TABLET | Refills: 0 | Status: SHIPPED | OUTPATIENT
Start: 2024-09-14 | End: 2024-09-19

## 2024-09-14 RX ORDER — ALBUTEROL SULFATE 90 UG/1
2 AEROSOL, METERED RESPIRATORY (INHALATION) EVERY 6 HOURS PRN
Qty: 18 G | Refills: 0 | Status: SHIPPED | OUTPATIENT
Start: 2024-09-14

## 2024-09-14 NOTE — PROGRESS NOTES
SUBJECTIVE:  Chief Complaint   Patient presents with    Urgent Care     Feels like an upper respiratory. Coughing. Started yesterday. Had COVID for the 1st time a couple weeks ago. Was out of town for almost 2 weeks. Symptoms started after they came home from vacation. Chest cold, sinus cold, sore throat. No fever. Has been coughing a little. But this week has been worse. Today woke up with a little bit of upper back pain. Has been getting worse. Hurts to breathe in a little bit. Deeper inhale will trigger cough. Doesn't feel sick.     Ashlie Gorman is a 59 year old female who presents with a chief complaint of cough, recent COVID infection.    Home rapid COVID test positive 8/27, was sick initially a few days before then.  Has sore throat, sinus congestion, cough and sympotms did improve that week.  Did repeat home rapid COVID test on Saturday and was negative    This week, started to develop more cough and mild sinus drainage, feels more allergies.  Has spit up mild phlegm, this is much better than when she had COVID    No fever    No past medical history on file.  Current Outpatient Medications   Medication Sig Dispense Refill    cetirizine (ZYRTEC) 5 MG tablet Take 5 mg by mouth daily      SYNTHROID 112 MCG OR TABS 100mcg      tretinoin (RETIN-A) 0.025 % external cream Apply pea-size amount to entire face every other night and slowly increase to nightly use as tolerated.      clindamycin (CLEOCIN T) 1 % external lotion  (Patient not taking: Reported on 9/14/2024)      Fexofenadine HCl (ALLEGRA PO)  (Patient not taking: Reported on 10/12/2021)      methylPREDNISolone (MEDROL DOSEPAK) 4 MG tablet therapy pack Follow Package Directions (Patient not taking: Reported on 10/12/2021) 21 tablet 0    spironolactone (ALDACTONE) 100 MG tablet Take 100 mg by mouth daily (Patient not taking: Reported on 9/14/2024)       Social History     Tobacco Use    Smoking status: Never    Smokeless tobacco: Never   Substance Use Topics     Alcohol use: Not on file       ROS:  Review of systems negative except as stated above.    EXAM:   /71 (BP Location: Right arm)   Pulse 75   Temp 97  F (36.1  C) (Tympanic)   SpO2 100%   GENERAL APPEARANCE: healthy, alert and no distress  CHEST: clear to auscultation  CV: regular rate and rhythm  PSYCH:alert, affect    CXR - no acute infiltrate, no pleural effusion, no pneumothorax, possible increase in density on left lateral sternal area personally viewed by me    ASSESSMENT/PLAN:  (R05.2) Subacute cough  (primary encounter diagnosis)  Plan: XR Chest 2 Views, benzonatate (TESSALON) 200 MG        capsule            (J22) Lower respiratory tract infection  Plan: albuterol (PROAIR HFA/PROVENTIL HFA/VENTOLIN         HFA) 108 (90 Base) MCG/ACT inhaler,         azithromycin (ZITHROMAX) 250 MG tablet            Recent COVID infection, here with cough which has persisted post COVID infection.  Vital reassuring and patient is not in acute respiratory distress.  CXR with mild changes noted, will follow up on formal Xray report and notify if any abnormalities.  RX Zpak given for lower respiratory tract infection, RX tessalon perles and RX albuterol inhaler given for cough    Follow up with primary provider if no improvement of symptoms in 1-2 weeks    Leonel Bills MD  September 14, 2024 5:55 PM

## 2024-11-10 ENCOUNTER — HEALTH MAINTENANCE LETTER (OUTPATIENT)
Age: 59
End: 2024-11-10